# Patient Record
Sex: MALE | Race: ASIAN | URBAN - METROPOLITAN AREA
[De-identification: names, ages, dates, MRNs, and addresses within clinical notes are randomized per-mention and may not be internally consistent; named-entity substitution may affect disease eponyms.]

---

## 2021-06-03 ENCOUNTER — RECORDS - HEALTHEAST (OUTPATIENT)
Dept: ADMINISTRATIVE | Facility: CLINIC | Age: 41
End: 2021-06-03

## 2025-04-03 ENCOUNTER — HOSPITAL ENCOUNTER (EMERGENCY)
Facility: CLINIC | Age: 45
Discharge: HOME | End: 2025-04-03
Attending: EMERGENCY MEDICINE
Payer: COMMERCIAL

## 2025-04-03 ENCOUNTER — APPOINTMENT (OUTPATIENT)
Dept: GENERAL RADIOLOGY | Facility: CLINIC | Age: 45
End: 2025-04-03
Attending: EMERGENCY MEDICINE
Payer: COMMERCIAL

## 2025-04-03 VITALS
DIASTOLIC BLOOD PRESSURE: 97 MMHG | SYSTOLIC BLOOD PRESSURE: 156 MMHG | OXYGEN SATURATION: 99 % | TEMPERATURE: 98.3 F | BODY MASS INDEX: 28.8 KG/M2 | RESPIRATION RATE: 19 BRPM | HEART RATE: 86 BPM | HEIGHT: 68 IN | WEIGHT: 190.04 LBS

## 2025-04-03 DIAGNOSIS — S62.639B OPEN FRACTURE OF TUFT OF DISTAL PHALANX OF FINGER: ICD-10-CM

## 2025-04-03 PROCEDURE — 250N000013 HC RX MED GY IP 250 OP 250 PS 637: Performed by: EMERGENCY MEDICINE

## 2025-04-03 PROCEDURE — 99284 EMERGENCY DEPT VISIT MOD MDM: CPT | Mod: 25

## 2025-04-03 PROCEDURE — 12001 RPR S/N/AX/GEN/TRNK 2.5CM/<: CPT

## 2025-04-03 PROCEDURE — 250N000009 HC RX 250: Performed by: EMERGENCY MEDICINE

## 2025-04-03 PROCEDURE — 73140 X-RAY EXAM OF FINGER(S): CPT | Mod: LT

## 2025-04-03 RX ORDER — OXYCODONE HYDROCHLORIDE 5 MG/1
5 TABLET ORAL ONCE
Status: COMPLETED | OUTPATIENT
Start: 2025-04-03 | End: 2025-04-03

## 2025-04-03 RX ORDER — CEPHALEXIN 500 MG/1
500 CAPSULE ORAL 4 TIMES DAILY
Qty: 20 CAPSULE | Refills: 0 | Status: SHIPPED | OUTPATIENT
Start: 2025-04-03 | End: 2025-04-08

## 2025-04-03 RX ORDER — HYDROCODONE BITARTRATE AND ACETAMINOPHEN 5; 325 MG/1; MG/1
1 TABLET ORAL EVERY 6 HOURS PRN
Qty: 10 TABLET | Refills: 0 | Status: SHIPPED | OUTPATIENT
Start: 2025-04-03 | End: 2025-04-06

## 2025-04-03 RX ORDER — CEPHALEXIN 500 MG/1
500 CAPSULE ORAL ONCE
Status: COMPLETED | OUTPATIENT
Start: 2025-04-03 | End: 2025-04-03

## 2025-04-03 RX ORDER — IBUPROFEN 600 MG/1
600 TABLET, FILM COATED ORAL ONCE
Status: COMPLETED | OUTPATIENT
Start: 2025-04-03 | End: 2025-04-03

## 2025-04-03 RX ADMIN — OXYCODONE HYDROCHLORIDE 5 MG: 5 TABLET ORAL at 12:36

## 2025-04-03 RX ADMIN — LIDOCAINE HYDROCHLORIDE 5 ML: 10 INJECTION, SOLUTION EPIDURAL; INFILTRATION; INTRACAUDAL; PERINEURAL at 12:37

## 2025-04-03 RX ADMIN — IBUPROFEN 600 MG: 600 TABLET, FILM COATED ORAL at 12:36

## 2025-04-03 RX ADMIN — CEPHALEXIN 500 MG: 500 CAPSULE ORAL at 12:36

## 2025-04-03 ASSESSMENT — COLUMBIA-SUICIDE SEVERITY RATING SCALE - C-SSRS
1. IN THE PAST MONTH, HAVE YOU WISHED YOU WERE DEAD OR WISHED YOU COULD GO TO SLEEP AND NOT WAKE UP?: NO
6. HAVE YOU EVER DONE ANYTHING, STARTED TO DO ANYTHING, OR PREPARED TO DO ANYTHING TO END YOUR LIFE?: NO
2. HAVE YOU ACTUALLY HAD ANY THOUGHTS OF KILLING YOURSELF IN THE PAST MONTH?: NO

## 2025-04-03 ASSESSMENT — ACTIVITIES OF DAILY LIVING (ADL)
ADLS_ACUITY_SCORE: 41

## 2025-04-03 NOTE — ED PROVIDER NOTES
"  History     Chief Complaint:  Laceration       HPI   Jose Enrique Mcpherson is a 44 year old male who presents with a finger laceration that occurred approximately 1 hour ago. Patient was working in his garden and felt a smash after pushing his finger on a pole. He presents with a laceration to his little left finger. Bleeding appears controlled in a piece of gauze.     Review of External notes      Medications:    Rheumatrex  Xanax  Folic acid    Past Medical History:    General anxiety disorder  Panic disorder  Psoriasis vulgaris  Gastroesophageal reflux  Dysphagia     Physical Exam   Patient Vitals for the past 24 hrs:   BP Temp Temp src Pulse Resp SpO2 Height Weight   04/03/25 1145 (!) 156/97 98.3  F (36.8  C) Temporal 86 19 99 % 1.727 m (5' 8\") 86.2 kg (190 lb 0.6 oz)        Physical Exam  Gen: alert  MSK  Left upper extremity: full AROM of wrist and elbow  Injury to 5th digit,  Tender to palpation at distal phlanx  5/5 strength flexion of MCP, PIP and and DIP  5/5 strength extension MCP, PIP and DIP joints  No gross ligamentous laxity of DIP or PIP joint    Skin: laceration of the fingertip involving the nail but with complete nail avulsion  Neuro: strength exam as above, normal sensation, sensation intact to pinprick on the pad of the injured digit  CV: normal capillary refill all 5 digits left hand      Emergency Department Course   ECG:  No results found for this or any previous visit.    Imaging:  XR Finger Left G/E 2 Views   Final Result   IMPRESSION: Acute mildly displaced fracture of the fifth distal phalanx tuft. Surrounding soft tissue swelling. No retained radiopaque foreign body.                Laboratory:  Labs Ordered and Resulted from Time of ED Arrival to Time of ED Departure - No data to display       ED Course as of 04/03/25 1817   Thu Apr 03, 2025   1220 I obtained history and examined the patient as noted above.           Interventions:  Medications   cephALEXin (KEFLEX) capsule 500 mg (500 mg " Oral $Given 4/3/25 1236)   oxyCODONE (ROXICODONE) tablet 5 mg (5 mg Oral $Given 4/3/25 1236)   ibuprofen (ADVIL/MOTRIN) tablet 600 mg (600 mg Oral $Given 4/3/25 1236)   lidocaine 1 % 5 mL (5 mLs Intradermal $Given 4/3/25 1237)          Laceration Repair      Procedure: Laceration Repair    Indication: Laceration    Consent: Verbal    Tetanus status reviewed 2023    Location: Left 5th digit    Length: 1.5 cm    Preparation: Irrigation with Sterile Saline.    Anesthesia/Sedation: digital block with lidocaine      Treatment/Exploration: Wound explored, no foreign bodies found , minimal debridement of nonvialble tissue    Closure: The wound was examined.  The partially avulsed fingernail was removed using hemostat straight iris scissors.  Following this, the nailbed was examined.  The nailbed was irrigated.  Nailbed was repaired with 3 x 5 0 Vicryl Rapide sutures.  Cuticle was repaired with 2 x 4 0 Ethilon sutures.  All sutures were interrupted , the nailbed and fingertip is well-approximated with this.  The nail was not amenable to replacement.  The nailbed was dressed with bacitracin Adaptic.  Care was taken to place Adaptic in the cuticle fold.    Patient Status: The patient tolerated the procedure well: Yes. There were no complications.      Impression & Plan    Independent Interpretation:  Xray finger left: Tuft fracture left 5th digit  See ED course    Medical Decision Making:  Patient presents for evaluation of finger laceration.  Tetanus 2023.  Keflex for infection prophylaxis.  This was clean wound with finger inside the glove when it happened.  No gross contamination.  X-ray shows tuft fracture.  This is open tuft fracture of the distal phalanx of the fifth digit.  Laceration repaired as noted above.  Discussed with patient possibility of deformity of the nail or complete loss of the nail.  Wound was repaired.  Wound was cleaned and dressed.  Finger splint placed.  Recommended to patient to keep wound covered  clean and dry.  Check wound in 48 hours if has not seen hand surgery in that time.  Gave instructions on how to redress wound.  Wear finger splint until hand surgery follow-up.  Discussed that would anticipate removal of nonabsorbable suture in 7 to 10 days however would defer ultimately to hand surgery.  Voicemail left for [facility].  Discharge home discussed signs and symptoms of infection they will return for these.    Norco as needed for pain.  I discussed opiate precautions.    Disposition:  Discharge    Diagnosis:    ICD-10-CM    1. Open fracture of tuft of distal phalanx of finger  S62.639B            Discharge Medications:  Discharge Medication List as of 4/3/2025  2:15 PM        START taking these medications    Details   cephALEXin (KEFLEX) 500 MG capsule Take 1 capsule (500 mg) by mouth 4 times daily for 5 days., Disp-20 capsule, R-0, E-Prescribe      HYDROcodone-acetaminophen (NORCO) 5-325 MG tablet Take 1 tablet by mouth every 6 hours as needed for severe pain., Disp-10 tablet, R-0, E-Prescribe              Opal Salcido  April 3, 2025    Scribe Disclosure:  I, Leana Muñoz, am serving as a scribe at 12:32 PM on 4/3/2025 to document services personally performed by Opal Salcido* based on my observations and the provider's statements to me.         Opal Salcido MD  04/03/25 4101

## 2025-04-03 NOTE — DISCHARGE INSTRUCTIONS
You were given a prescription for norco (hydrocodone and acetaminophen).  This is a strong, narcotic pain medication.  It may cause drowsiness and mild changes in cognition.  Do not drive or operate machinery while taking this medication.  Do not mix this medication with alcohol or other sedating medications.  This medication contains tylenol (acetaminophen) therefore do not take additional tylenol (acetaminophen) while taking norco.  This medication can cause constipation.  The use of over the counter laxatives and stool softeners can help prevent this.      Keep the wound covered clean and dry.  Watch for increasing pain redness swelling or drainage from the finger.  Return to the emergency department if you see these things.  Follow-up closely with hand surgery.  Referral was sent to [facility].    If not able to see hand surgery within 2 days, gently remove the dressing check the wound replace bacitracin and nonstick dressing over the nailbed rewrapped.  Keep splint in place.  Keep wound covered clean and dry.

## 2025-04-03 NOTE — ED TRIAGE NOTES
Pt was working in the garden and hit his left little finger on a pole. The nail appears to be detached from the nail bed.  Bleeding appears controlled in a piece of gauze.       Triage Assessment (Adult)       Row Name 04/03/25 1141          Triage Assessment    Airway WDL WDL        Respiratory WDL    Respiratory WDL WDL